# Patient Record
Sex: FEMALE | Race: WHITE | Employment: STUDENT | URBAN - METROPOLITAN AREA
[De-identification: names, ages, dates, MRNs, and addresses within clinical notes are randomized per-mention and may not be internally consistent; named-entity substitution may affect disease eponyms.]

---
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
## 2025-08-02 ENCOUNTER — OFFICE VISIT (OUTPATIENT)
Dept: URGENT CARE | Age: 20
End: 2025-08-02
Payer: COMMERCIAL

## 2025-08-02 VITALS
RESPIRATION RATE: 18 BRPM | WEIGHT: 128 LBS | TEMPERATURE: 98.3 F | OXYGEN SATURATION: 100 % | SYSTOLIC BLOOD PRESSURE: 126 MMHG | HEIGHT: 65 IN | DIASTOLIC BLOOD PRESSURE: 84 MMHG | HEART RATE: 67 BPM | BODY MASS INDEX: 21.33 KG/M2

## 2025-08-02 DIAGNOSIS — H00.022 HORDEOLUM INTERNUM OF RIGHT LOWER EYELID: Primary | ICD-10-CM

## 2025-08-02 RX ORDER — TOBRAMYCIN 3 MG/ML
1-2 SOLUTION/ DROPS OPHTHALMIC EVERY 4 HOURS
Qty: 5 ML | Refills: 0 | Status: SHIPPED | OUTPATIENT
Start: 2025-08-02 | End: 2025-08-09

## 2025-08-02 RX ORDER — DOXYCYCLINE 100 MG/1
100 CAPSULE ORAL 2 TIMES DAILY
Qty: 20 CAPSULE | Refills: 0 | Status: SHIPPED | OUTPATIENT
Start: 2025-08-02 | End: 2025-08-12

## 2025-08-02 NOTE — PROGRESS NOTES
"Subjective   Patient ID: Adriana Crawford is a 20 y.o. female. She presents today with a chief complaint of Eye Problem (Possible stye R eye. Very painful).    History of Present Illness    Eye Problem    Patient presents with a 3 day history of right lower eyelid, painful bump. She denies visual changes. No drainage from eye is reported. She does not wear contacts. No other symptoms are reported.    Past Medical History  Allergies as of 08/02/2025    (No Known Allergies)       Prescriptions Prior to Admission[1]     Medical History[2]    Surgical History[3]     reports that she has never smoked. She has never used smokeless tobacco. She reports that she does not drink alcohol and does not use drugs.    Review of Systems  Review of Systems                               Objective    Vitals:    08/02/25 1249   BP: 126/84   Pulse: 67   Resp: 18   Temp: 36.8 °C (98.3 °F)   TempSrc: Oral   SpO2: 100%   Weight: 58.1 kg (128 lb)   Height: 1.651 m (5' 5\")     Patient's last menstrual period was 07/30/2025 (exact date).    Physical Exam  Vitals and nursing note reviewed.   Constitutional:       General: She is not in acute distress.     Appearance: Normal appearance.   HENT:      Nose: Nose normal.      Mouth/Throat:      Mouth: Mucous membranes are moist.      Pharynx: Oropharynx is clear.     Eyes:      General: No scleral icterus.        Right eye: No discharge.         Left eye: No discharge.      Extraocular Movements: Extraocular movements intact.      Pupils: Pupils are equal, round, and reactive to light.      Comments: Internal hordeolum, right lower eyelid     Musculoskeletal:      Cervical back: Normal range of motion and neck supple. No rigidity or tenderness.   Lymphadenopathy:      Cervical: No cervical adenopathy.     Neurological:      Mental Status: She is alert.         Procedures    Point of Care Test & Imaging Results from this visit  No results found for this visit on 08/02/25.   Imaging  No results " found.    Cardiology, Vascular, and Other Imaging  No other imaging results found for the past 2 days      Diagnostic study results (if any) were reviewed by Tana Pavon MD.    Assessment/Plan   Allergies, medications, history, and pertinent labs/EKGs/Imaging reviewed by Tana Pavon MD.     Medical Decision Making      Orders and Diagnoses  There are no diagnoses linked to this encounter.    Medical Admin Record      Patient disposition: Home    Electronically signed by Tana Pavon MD  1:00 PM           [1] (Not in a hospital admission)  [2]   Past Medical History:  Diagnosis Date    Acute upper respiratory infection, unspecified 01/08/2019    Acute URI    Personal history of other diseases of the respiratory system 01/08/2019    History of sore throat    Personal history of other infectious and parasitic diseases 03/18/2015    History of viral infection    Personal history of other specified conditions 01/08/2019    History of fever    Unspecified sprain of left wrist, initial encounter 02/28/2019    Sprain of left wrist, initial encounter   [3]   Past Surgical History:  Procedure Laterality Date    OTHER SURGICAL HISTORY  10/03/2022    Tooth extraction

## 2025-08-02 NOTE — PATIENT INSTRUCTIONS
Antibiotics as directed; take with food  Eye drops as directed  Cool compresses as directed  Follow up with new or worsening symptoms